# Patient Record
(demographics unavailable — no encounter records)

---

## 2024-12-11 NOTE — HISTORY OF PRESENT ILLNESS
[Right] : right hand dominant [FreeTextEntry1] : He comes in today for evaluation of right-hand pain mainly around the area of his thumb. He states that the pain started 1 month ago. He rates his pain as a 5/10. He has some swelling, but denies any radiating pain.   He has a medical history of prediabetes.

## 2024-12-11 NOTE — DISCUSSION/SUMMARY
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   I had a discussion with the patient regarding today's visit, the prognosis of this diagnosis, and treatment recommendations and options. At this time, I discussed treatment options of bracing and taking an anti-inflammatory or a cortisone injection, which he defers. He was provided with a right cool comfort splint. He told me that he had vertigo when he tried meloxicam in the past. Instead, I recommended he begin a course of Celebrex 1-2 times a day with meals. I warned about potential GI side effects.  If he is not improving, then he will follow-up for a cortisone injection in the future.   The patient has agreed to the above plan of management and has expressed full understanding. All questions were fully answered to the patient's satisfaction.   My cumulative time spent on this visit included: Preparation for the visit, review of the medical records, review of pertinent diagnostic studies, examination and counseling of the patient on the above diagnosis, treatment plan and prognosis, orders of diagnostic tests, medication and/or appropriate procedures and documentation in the medical records of today's visit.

## 2024-12-11 NOTE — END OF VISIT
[FreeTextEntry3] : This note was written by Cornelio Shields on 12/11/2024 acting solely as a scribe for Dr. Jose Lofton.   All medical record entries made by the Scribe were at my, Dr. Jose Lofton, direction and personally dictated by me on 12/11/2024. I have personally reviewed the chart and agree that the record accurately reflects my personal performance of the history, physical exam, assessment and plan.

## 2024-12-11 NOTE — ADDENDUM
[FreeTextEntry1] :  I, Cornelio Shields, acted solely as a scribe for Dr. Lofton on this date on 12/11/2024.

## 2024-12-11 NOTE — PHYSICAL EXAM
[de-identified] : - Constitutional: This is a male in no obvious distress.  - Psych: Patient is alert and oriented to person, place and time.  Patient has a normal mood and affect. - Cardiovascular: Normal pulses throughout the upper extremities.  No significant varicosities are noted in the upper extremities. - Neuro: Strength and sensation are intact throughout the upper extremities.  Patient has normal coordination. - Respiratory:  Patient exhibits no evidence of shortness of breath or difficulty breathing. - Skin: No rashes, lesions, or other abnormalities are noted in the upper extremities. ---   Side: Right Thumb -  There is swelling and tenderness along the basal joint of the thumb.  -  There is a positive grind test.  -  There is no instability of the basal joint of the thumb.  -  There is no evidence of a trigger thumb.  -  There is no evidence of DeQuervain's tendonitis.  -  Provocative signs for carpal tunnel syndrome are negative.  -  There is normal strength and sensation distally along the radial, ulnar and median nerve distributions.  -  There is full composite range-of-motion of the other digits into the palm. [de-identified] : AP, lateral, and oblique radiographs of the right hand demonstrate moderate basal joint arthritis.

## 2025-01-24 NOTE — RETURN TO WORK/SCHOOL
[FreeTextEntry1] : Jamel was seen today for evaluation and management of multiple orthopedic injuries after a recent fall.  He requires appropriate rest and course of medication and therapy.  He is unable to perform his regular commute at this time.  Please allow him to work remotely for the next 1 month.  He is permitted to return to regular commute and work duties as of 3/3/2025. Should you have any questions please call the office at [phone] Thank you for your understanding.     Prince Kirk DO, ATC [facility] [place]

## 2025-01-24 NOTE — DISCUSSION/SUMMARY
[de-identified] : Discussed findings of today's exam and possible causes of patient's pain.  Educated patient on their most probable diagnosis of multiple orthopedic injuries status post fall, right shoulder impingement, right rib contusion with resultant costochondritis, and bilateral knee pain due to exacerbation of underlying osteoarthritis.  Reviewed possible courses of treatment, and we collaboratively decided best course of treatment at this time will include conservative management.  Patient is already had negative x-rays at urgent care, there is no evidence of fracture.  Patient has functional range of motion and strength regarding his right shoulder, there is low likelihood of acute complete rotator cuff tear.  Patient has prior existing rotator cuff impingement, he likely has some pre-existing rotator cuff partial tearing, but he has no evidence of complete tear or rupture at this time, recommend deferral of MRI as patient has no immediate surgical indications.  Patient has right-sided rib pain status post fall due to contusion and resultant costochondritis.  He has already had negative x-rays, no need for repeat imaging at this time.  Patient is advised that he would benefit from obtaining over-the-counter incentive spirometer and utilizing this breathing device 3-5 times daily to avoid atelectasis or possible development of pneumonia.  Patient is advised that rib contusion with costochondritis can take upwards of 4-8 weeks to fully resolve.  Patient has bilateral knee pain due to exacerbation of pre-existing underlying osteoarthritis.  While there may be pre-existing degenerative meniscus pathology, patient has no acute effusion, no instability, no apparent surgical indications, no need for MRIs at this time.  Patient already had urgent care x-rays which were negative for fracture.  Patient would benefit from regular use of oral NSAIDs, he has some at home already.  However, he has not been taking them correctly.  He has a 24-hour extended release Celebrex, and he has been intermittently taking Motrin and Aleve.  Patient is advised that these are all oral NSAIDs and should not be combined.  If patient is going to take 24-hour extended release Celebrex he should not be taking any other over-the-counter NSAIDs as this will increase risk of GI upset, GI ulcer, or kidney injury.  Patient may take Tylenol as needed for breakthrough pain.  Patient states an understanding of this prescription drug management plan.  Patient will be started on a course of physical therapy to restore normal range of motion and strength as tolerated.  If patient has persisting shoulder and/or knee pain may consider injections as future treatment options. Patient has to commute via mass transit from [place] to [place], at this time it is difficult for him to do so because of his multiple orthopedic injuries.  He will be given a work note that he can work remotely for the next 1 month.  If he cannot return to his commute at that time he should follow-up in the office for reassessment.  Follow up as needed.  Patient appreciates and agrees with current plan.  This note was generated using dragon medical dictation software.  A reasonable effort has been made for proofreading its contents, but typos may still remain.  If there are any questions or points of clarification needed please notify my office.

## 2025-01-24 NOTE — PHYSICAL EXAM
[de-identified] : Constitutional: Well-nourished, well-developed, No acute distress, morbidly obese Respiratory:  Good respiratory effort, no SOB Lymphatic: No regional lymphadenopathy, no lymphedema Psychiatric: Pleasant and normal affect, alert and oriented x3 Musculoskeletal: normal except where as noted in regional exam  Right shoulder: APPEARANCE: no marked deformities, no swelling or malalignment POSITIVE TENDERNESS: supraspinatus NONTENDER:  infraspinatus, teres minor. biceps. anterior and posterior capsule. AC joint.  ROM: Flexion/abduction limited to 90 degrees with moderate painful arc past 60 degrees, no scapular winging or dyskinesia present RESISTIVE TESTING: MMT 4/5 ER and empty can, 5/5 IR. painless 5/5 resisted flex/ext, horizontal abd/add  SPECIAL TESTS: + Carranza and Neers, mildly + cross arm adduction, neg Speeds, neg Vasquez's, neg Drop Arm, neg Apprehension. neg apley's scratch test  Rib cage: POSITIVE TENDERNESS: + TTP of the right 6-10 costochondral junctions, no bony rib tenderness NONTENDER:  No tenderness of all other ribs on the right, nontender left sided ribs 1-12 along the anterior, body and posterior portions b/l, no bony tenderness of the thoracic spine, no facet tenderness, no tenderness of xiphoid, sternum or manubrium, no clavicular, SC or AC joint tenderness b/l.  no tenderness of the diaphragm with deep palpation  Bilateral knees: APPEARANCE: no marked deformities, no swelling or malalignment POSITIVE TENDERNESS:  + crepitus of the anterior knee, and tenderness of patellar retinaculum NONTENDER: jt lines b/l, patellar & quadriceps tendons, MCL/LCL, ITB at the lateral femoral condyle & Gerdy's tubercle, pes bursa.  ROM: full extension, flexion limited to 100 degrees due to stiffness and body habitus  RESISTIVE TESTING: + discomfort with knee ext from deep knee flexion (stretched position), painless knee flexion.  SPECIAL TESTS: stable v/v stress. painless grind. neg Lachman's. neg ant/post drawer. neg Geremias's.    [de-identified] : I reviewed, interpreted and clinically correlated the following outside imaging studies, [facility] X-rays, 3 views of the right shoulder, no acute fracture seen, moderate AC joint osteoarthritis, mild glenohumeral joint osteoarthritis X-rays, 4 views of the right ribs, no acute fracture seen.

## 2025-01-24 NOTE — HISTORY OF PRESENT ILLNESS
[de-identified] : 59 M presents for evaluation or right shoulder pain s/p fall 1/15/24 Patient states that he was walking in the street and fell, he is unsure exactly how he fell He endorses right shoulder pain and difficulty raising his arm above the level of his shoulder, he also endorses pain at night as well as limited ROM He went to [facility] the following day, xrays of both shoulder, both knees, right sided chest were performed, and he was told that nothing is broken. s/p bilateral knee synvisc injection by Dr. Sam in September He states that he is taking Tylenol and Celebrex

## 2025-01-31 NOTE — ADDENDUM
[FreeTextEntry1] :  I, Cornelio Shields, acted solely as a scribe for Dr. Lofton on this date on 01/31/2025.

## 2025-01-31 NOTE — DISCUSSION/SUMMARY
[FreeTextEntry1] : He sustained a sprain to his right thumb CMC joint after a fall 2 weeks ago.  He exacerbated his symptoms where he has underlying CMC joint arthritis of the thumb.  I had a discussion regarding today's visit, the diagnosis and treatment recommendations and options.  We also discussed changes since the last visit.  At this time, he deferred a cortisone injection at the CMC joint.  Therefore, I recommended observation and anti-inflammatories as needed. If his symptoms do not improve, then he will follow-up for a cortisone injection in the future.  The patient has agreed to the above plan of management and has expressed full understanding.  All questions were fully answered to the patient's satisfaction.  My cumulative time spent on today's visit was greater than 30 minutes and included: Preparation for the visit, review of the medical records, review of pertinent diagnostic studies, examination and counseling of the patient on the above diagnosis, treatment plan and prognosis, orders of diagnostic tests, medications and/or appropriate procedures and documentation in the medical records of today's visit.

## 2025-01-31 NOTE — PHYSICAL EXAM
[de-identified] : - Constitutional: This is a male in no obvious distress.  - Psych: Patient is alert and oriented to person, place and time.  Patient has a normal mood and affect. - Cardiovascular: Normal pulses throughout the upper extremities.  No significant varicosities are noted in the upper extremities. - Neuro: Strength and sensation are intact throughout the upper extremities.  Patient has normal coordination. - Respiratory:  Patient exhibits no evidence of shortness of breath or difficulty breathing. - Skin: No rashes, lesions, or other abnormalities are noted in the upper extremities. ---  Examination of his right wrist and hand demonstrates swelling and tenderness along the CMC joint of the thumb.  There is no evidence of trigger thumb or de Quervain's tendinitis.  There is no swelling or tenderness along the distal radius dorsally or snuffbox.  He is neurovascularly intact distally. [de-identified] : PA, lateral, and oblique radiographs of the right wrist and hand demonstrate moderate basal joint arthritis. No fractures or dislocations.

## 2025-01-31 NOTE — END OF VISIT
[FreeTextEntry3] : This note was written by Cornelio Shields on 01/31/2025 acting solely as a scribe for Dr. Jose Lofton.   All medical record entries made by the Scribe were at my, Dr. Jose Lofton, direction and personally dictated by me on 01/31/2025. I have personally reviewed the chart and agree that the record accurately reflects my personal performance of the history, physical exam, assessment and plan.

## 2025-01-31 NOTE — HISTORY OF PRESENT ILLNESS
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  See note from when he was seen in the office 51 days ago.  He deferred a cortisone injection.  He was fitted with a splint and he was started on a course of Celebrex 200 mg once or twice a day.  He returns today, for right wrist/hand pain after a fall 2 weeks ago. He has weakness.   He has a medical history of prediabetes.

## 2025-01-31 NOTE — PHYSICAL EXAM
[de-identified] : - Constitutional: This is a male in no obvious distress.  - Psych: Patient is alert and oriented to person, place and time.  Patient has a normal mood and affect. - Cardiovascular: Normal pulses throughout the upper extremities.  No significant varicosities are noted in the upper extremities. - Neuro: Strength and sensation are intact throughout the upper extremities.  Patient has normal coordination. - Respiratory:  Patient exhibits no evidence of shortness of breath or difficulty breathing. - Skin: No rashes, lesions, or other abnormalities are noted in the upper extremities. ---  Examination of his right wrist and hand demonstrates swelling and tenderness along the CMC joint of the thumb.  There is no evidence of trigger thumb or de Quervain's tendinitis.  There is no swelling or tenderness along the distal radius dorsally or snuffbox.  He is neurovascularly intact distally. [de-identified] : PA, lateral, and oblique radiographs of the right wrist and hand demonstrate moderate basal joint arthritis. No fractures or dislocations.

## 2025-03-24 NOTE — HISTORY OF PRESENT ILLNESS
[de-identified] : 59 M with right shoulder impingement, right rib contusion with resultant costochondritis, and bilateral knee pain due to exacerbation of underlying osteoarthritis. Pt presents for f/u Pt states pain is stable, Pt states at times pain can be severe. Pt took celecoxib and takes Tylenol PRN, these provides no pain relief. Pt does not participate with PT. Prior hx: Pt is s/p fall 1/15/24 Patient states that he was walking in the street and fell, he is unsure exactly how he fell He endorses right shoulder pain and difficulty raising his arm above the level of his shoulder, he also endorses pain at night as well as limited ROM He went to [facility] the following day, xrays of both shoulder, both knees, right sided chest were performed, and he was told that nothing is broken. s/p bilateral knee synvisc injection by Dr. Sam in September He states that he is taking Tylenol and Celebrex

## 2025-03-24 NOTE — DISCUSSION/SUMMARY
[de-identified] : Patient was seen today for evaluation of right shoulder pain due to subacromial impingement.  There has been no significant interval change in his condition since last evaluation.  He has not started physical therapy as of yet, at this time recommend he begin his course of physical therapy to address this issue. Patient was also seen today for evaluation of bilateral knee pain due to exacerbation of underlying osteoarthritis.  Again, he is yet to start physical therapy for this issue, he is courage to do so as this is the best next step in management.  A new prescription for physical therapy has been provided for these issues. Patient has resolution of his rib pain, he no longer has any pain in the ribs.  No need for therapy or other interventions at this time. If patient has persisting right shoulder and/or bilateral knee pain may consider cortisone injections as future treatment options, patient like to defer today.  Follow up as needed.  Patient appreciates and agrees with current plan.  This note was generated using dragon medical dictation software.  A reasonable effort has been made for proofreading its contents, but typos may still remain.  If there are any questions or points of clarification needed please notify my office.

## 2025-03-24 NOTE — HISTORY OF PRESENT ILLNESS
[de-identified] : 59 M with right shoulder impingement, right rib contusion with resultant costochondritis, and bilateral knee pain due to exacerbation of underlying osteoarthritis. Pt presents for f/u Pt states pain is stable, Pt states at times pain can be severe. Pt took celecoxib and takes Tylenol PRN, these provides no pain relief. Pt does not participate with PT. Prior hx: Pt is s/p fall 1/15/24 Patient states that he was walking in the street and fell, he is unsure exactly how he fell He endorses right shoulder pain and difficulty raising his arm above the level of his shoulder, he also endorses pain at night as well as limited ROM He went to [facility] the following day, xrays of both shoulder, both knees, right sided chest were performed, and he was told that nothing is broken. s/p bilateral knee synvisc injection by Dr. Sam in September He states that he is taking Tylenol and Celebrex

## 2025-03-24 NOTE — DISCUSSION/SUMMARY
[de-identified] : Patient was seen today for evaluation of right shoulder pain due to subacromial impingement.  There has been no significant interval change in his condition since last evaluation.  He has not started physical therapy as of yet, at this time recommend he begin his course of physical therapy to address this issue. Patient was also seen today for evaluation of bilateral knee pain due to exacerbation of underlying osteoarthritis.  Again, he is yet to start physical therapy for this issue, he is courage to do so as this is the best next step in management.  A new prescription for physical therapy has been provided for these issues. Patient has resolution of his rib pain, he no longer has any pain in the ribs.  No need for therapy or other interventions at this time. If patient has persisting right shoulder and/or bilateral knee pain may consider cortisone injections as future treatment options, patient like to defer today.  Follow up as needed.  Patient appreciates and agrees with current plan.  This note was generated using dragon medical dictation software.  A reasonable effort has been made for proofreading its contents, but typos may still remain.  If there are any questions or points of clarification needed please notify my office.

## 2025-03-24 NOTE — PHYSICAL EXAM
[de-identified] : Constitutional: Well-nourished, well-developed, No acute distress, morbidly obese Respiratory:  Good respiratory effort, no SOB Lymphatic: No regional lymphadenopathy, no lymphedema Psychiatric: Pleasant and normal affect, alert and oriented x3 Musculoskeletal: normal except where as noted in regional exam  Right shoulder: APPEARANCE: no marked deformities, no swelling or malalignment POSITIVE TENDERNESS: supraspinatus NONTENDER:  infraspinatus, teres minor. biceps. anterior and posterior capsule. AC joint.  ROM: Flexion/abduction limited to 90 degrees with moderate painful arc past 60 degrees, no scapular winging or dyskinesia present RESISTIVE TESTING: MMT 4/5 ER and empty can, 5/5 IR. painless 5/5 resisted flex/ext, horizontal abd/add  SPECIAL TESTS: + Carranza and Neers, mildly + cross arm adduction, neg Speeds, neg Vasquez's, neg Drop Arm, neg Apprehension. neg apley's scratch test  Bilateral knees: APPEARANCE: no marked deformities, no swelling or malalignment POSITIVE TENDERNESS:  + crepitus of the anterior knee, and tenderness of patellar retinaculum NONTENDER: jt lines b/l, patellar & quadriceps tendons, MCL/LCL, ITB at the lateral femoral condyle & Gerdy's tubercle, pes bursa.  ROM: full extension, flexion limited to 100 degrees due to stiffness and body habitus  RESISTIVE TESTING: + discomfort with knee ext from deep knee flexion (stretched position), painless knee flexion.  SPECIAL TESTS: stable v/v stress. painless grind. neg Lachman's. neg ant/post drawer. neg Geremias's.   Rib cage: No tenderness of ribs 1-12 along the anterior, body and posterior portions, no bony tenderness of the thoracic spine, no facet tenderness, no tenderness of xiphoid, sternum or manubrium, no clavicular, SC or AC joint tenderness b/l.  No tenderness of the costochondral junctions b/l, no tenderness of the diaphragm with deep palpation, and no exacerbation of pain with deep breathing.

## 2025-03-24 NOTE — PHYSICAL EXAM
[de-identified] : Constitutional: Well-nourished, well-developed, No acute distress, morbidly obese Respiratory:  Good respiratory effort, no SOB Lymphatic: No regional lymphadenopathy, no lymphedema Psychiatric: Pleasant and normal affect, alert and oriented x3 Musculoskeletal: normal except where as noted in regional exam  Right shoulder: APPEARANCE: no marked deformities, no swelling or malalignment POSITIVE TENDERNESS: supraspinatus NONTENDER:  infraspinatus, teres minor. biceps. anterior and posterior capsule. AC joint.  ROM: Flexion/abduction limited to 90 degrees with moderate painful arc past 60 degrees, no scapular winging or dyskinesia present RESISTIVE TESTING: MMT 4/5 ER and empty can, 5/5 IR. painless 5/5 resisted flex/ext, horizontal abd/add  SPECIAL TESTS: + Carranza and Neers, mildly + cross arm adduction, neg Speeds, neg Vasquez's, neg Drop Arm, neg Apprehension. neg apley's scratch test  Bilateral knees: APPEARANCE: no marked deformities, no swelling or malalignment POSITIVE TENDERNESS:  + crepitus of the anterior knee, and tenderness of patellar retinaculum NONTENDER: jt lines b/l, patellar & quadriceps tendons, MCL/LCL, ITB at the lateral femoral condyle & Gerdy's tubercle, pes bursa.  ROM: full extension, flexion limited to 100 degrees due to stiffness and body habitus  RESISTIVE TESTING: + discomfort with knee ext from deep knee flexion (stretched position), painless knee flexion.  SPECIAL TESTS: stable v/v stress. painless grind. neg Lachman's. neg ant/post drawer. neg Geremias's.   Rib cage: No tenderness of ribs 1-12 along the anterior, body and posterior portions, no bony tenderness of the thoracic spine, no facet tenderness, no tenderness of xiphoid, sternum or manubrium, no clavicular, SC or AC joint tenderness b/l.  No tenderness of the costochondral junctions b/l, no tenderness of the diaphragm with deep palpation, and no exacerbation of pain with deep breathing.

## 2025-06-13 NOTE — PHYSICAL EXAM
[de-identified] : Constitutional: Well-nourished, well-developed, No acute distress, morbidly obese Respiratory:  Good respiratory effort, no SOB Lymphatic: No regional lymphadenopathy, no lymphedema Psychiatric: Pleasant and normal affect, alert and oriented x3 Musculoskeletal: normal except where as noted in regional exam  Bilateral shoulders: APPEARANCE: no marked deformities, no swelling or malalignment POSITIVE TENDERNESS: supraspinatus NONTENDER:  infraspinatus, teres minor. biceps. anterior and posterior capsule. AC joint.  ROM: Flexion/abduction limited to 120 degrees with moderate painful arc past 60 degrees, no scapular winging or dyskinesia present RESISTIVE TESTING: MMT 4/5 ER and empty can, 5/5 IR. painless 5/5 resisted flex/ext, horizontal abd/add  SPECIAL TESTS: + Carranza and Neers, mildly + cross arm adduction, neg Speeds, neg Vasquez's, neg Drop Arm, neg Apprehension. neg apley's scratch test

## 2025-06-13 NOTE — DISCUSSION/SUMMARY
[de-identified] : Patient was seen today for evaluation and management of chronic intermittent bilateral shoulder pain with recent atraumatic exacerbation due to subacromial impingement.  Patient is undergoing physical therapy for several months now, he has interval improvement in his condition with improved range of motion, but continues to have persistent pain and dysfunction with ADLs as well as pain at night.  Patient has not yet reached maximal therapeutic benefit from his course of physical therapy, at this time it is medically necessary and clinically indicated.  Patient has limited findings on clinical exam that would benefit from surgical consultation as he has no significant weakness, and has significant medical comorbidities.  Recommend deferral of MRI as patient is not yet a candidate for surgical consultation.  We discussed various treatment options as well as associated risk/benefits/alternatives and patient elected to proceed with bilateral cortisone injections today (see procedure note).  Informed the patient that the numbing medicine in today's injection will last for about 4-6 hours. The steroid that was injected will start to work in 1 to 2 days, peak at 1-2 weeks, and may last up to 1-2 months.  Patient will continue his course of physical therapy with adherence to home exercise program.  Follow up as needed.  Patient appreciates and agrees with current plan.  This note was generated using dragon medical dictation software.  A reasonable effort has been made for proofreading its contents, but typos may still remain.  If there are any questions or points of clarification needed please notify my office.

## 2025-06-13 NOTE — PROCEDURE
[de-identified] : Injection: Left Shoulder Subacromial Space. Indication: Impingement.  A discussion was had with the patient regarding this procedure and all questions were answered. All risks, benefits and alternatives were discussed. These included but were not limited to bleeding, infection, and allergic reaction.  A timeout was done to ensure correct side and patient agreed to the procedure.  A Huslia fanny was created on the skin utilizing a plastic needle cap to fanny the anticipated point of entry. Alcohol was used to clean the skin, and Betadine was used to sterilize and prep the area in the posterior aspect of the shoulder. Ethyl chloride spray was then used as a topical anesthetic. A 22-gauge 1.5" needle was used to inject 2cc of 0.25% bupivacaine without epinephrine and 1cc of 40mg/ml methylprednisolone into the subacromial space. A sterile bandage was then applied. The patient tolerated the procedure well and there were no complications. ___________________ Injection: Right  Shoulder Subacromial Space. Indication: Impingement.  A discussion was had with the patient regarding this procedure and all questions were answered. All risks, benefits and alternatives were discussed. These included but were not limited to bleeding, infection, and allergic reaction.  A timeout was done to ensure correct side and patient agreed to the procedure.  A Huslia fanny was created on the skin utilizing a plastic needle cap to fanny the anticipated point of entry. Alcohol was used to clean the skin, and Betadine was used to sterilize and prep the area in the posterior aspect of the shoulder. Ethyl chloride spray was then used as a topical anesthetic. A 22-gauge 1.5" needle was used to inject 2cc of 0.25% bupivacaine without epinephrine and 1cc of 40mg/ml methylprednisolone into the subacromial space. A sterile bandage was then applied. The patient tolerated the procedure well and there were no complications.

## 2025-06-13 NOTE — HISTORY OF PRESENT ILLNESS
[de-identified] : 59 M presents for follow-up right bilateral shoulder pain  He has been attending Physical therapy for both shoulders, last went this week, has attended 24 sessions He endorses continued lateral shoulder pain, worse with overhead activity especially when raising arm above head  He has never had shoulder injections he states Pain started after fall 1/15/24 He typically takes Tylenol for pain

## 2025-07-11 NOTE — PHYSICAL EXAM
[de-identified] : - Constitutional: This is a male in no obvious distress.  - Psych: Patient is alert and oriented to person, place and time.  Patient has a normal mood and affect. - Cardiovascular: Normal pulses throughout the upper extremities.  No significant varicosities are noted in the upper extremities.   Examination of his right wrist and hand demonstrates swelling and tenderness along the CMC joint of the thumb.  There is no evidence of trigger thumb or de Quervain's tendinitis.  There is no swelling or tenderness along the distal radius dorsally or snuffbox.  He is neurovascularly intact distally. [de-identified] : PA, lateral, and oblique radiographs of the right wrist and hand dated 1/31/2025 demonstrated moderate basal joint arthritis. No fractures or dislocations.

## 2025-07-11 NOTE — HISTORY OF PRESENT ILLNESS
[Right] : right hand dominant [FreeTextEntry1] : He returns today with pain in his right wrist, which radiates up his forearm. His symptoms are unchanged from his last visit, and sometimes worsen due to his work. He rates his pain a 4/10, and denies any numbness/tingling.  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  He was treated with splinting and Celebrex 200 mg a day.   He has a medical history of prediabetes.

## 2025-07-11 NOTE — PROCEDURE
[FreeTextEntry1] :  - After a discussion of risks and benefits, the patient agreed to proceed with a cortisone injection.  - Side Injected: Right thumb carpometacarpal joint. - Medications injected: 0.5cc of 1% Lidocaine and 1cc of 40mg of Depomedrol, using sterile technique. - Ultrasound Guidance: Ultrasound guidance was used, because of anatomical considerations and deformity, to confirm correct localization of the needle within the carpometacarpal joint, prior to the injection. - Patient tolerated the procedure well, without complications. - Patient noted immediate relief of the symptoms, secondary to the anesthetic effects of the injection. - Patient was told that the pain may worsen for a day or two, and should then begin to improve. - Instructions: The patient was instructed on the use of ice, anti-inflammatory agents, or Tylenol, and activity modification. - Follow-up: Within 4 weeks to assess the response to the injection.

## 2025-07-11 NOTE — DISCUSSION/SUMMARY
[FreeTextEntry1] : I had a discussion regarding today's visit, the diagnosis and treatment recommendations and options.  We also discussed changes since the last visit.  At this time, I discussed treatment options including adminstration of a cortisone injection or observation of his symptoms. At this time, he proceeded with the cortisone injection. He will follow up with me as needed.  As a prediabetic, he understands that this will likely elevate his blood sugars in the short term.  The patient has agreed to the above plan of management and has expressed full understanding.  All questions were fully answered to the patient's satisfaction.  My cumulative time spent on today's visit was greater than 30 minutes and included: Preparation for the visit, review of the medical records, review of pertinent diagnostic studies, examination and counseling of the patient on the above diagnosis, treatment plan and prognosis, orders of diagnostic tests, medications and/or appropriate procedures and documentation in the medical records of today's visit.

## 2025-07-11 NOTE — PHYSICAL EXAM
[de-identified] : - Constitutional: This is a male in no obvious distress.  - Psych: Patient is alert and oriented to person, place and time.  Patient has a normal mood and affect. - Cardiovascular: Normal pulses throughout the upper extremities.  No significant varicosities are noted in the upper extremities.   Examination of his right wrist and hand demonstrates swelling and tenderness along the CMC joint of the thumb.  There is no evidence of trigger thumb or de Quervain's tendinitis.  There is no swelling or tenderness along the distal radius dorsally or snuffbox.  He is neurovascularly intact distally. [de-identified] : PA, lateral, and oblique radiographs of the right wrist and hand dated 1/31/2025 demonstrated moderate basal joint arthritis. No fractures or dislocations.

## 2025-07-11 NOTE — END OF VISIT
[FreeTextEntry3] :  This note was written by Lukasz Graham on 07/11/2025 acting solely as a scribe for Dr. Jose Lofton.   All medical record entries made by the Scribe were at my, Dr. Jose Lofton, direction and personally dictated by me on 07/11/2025. I have personally reviewed the chart and agree that the record accurately reflects my personal performance of the history, physical exam, assessment and plan.

## 2025-07-11 NOTE — ADDENDUM
[FreeTextEntry1] : I, Lukasz Graham, acted solely as a scribe for Dr. Lofton on this date on 07/11/2025.